# Patient Record
Sex: FEMALE | Race: ASIAN | URBAN - METROPOLITAN AREA
[De-identification: names, ages, dates, MRNs, and addresses within clinical notes are randomized per-mention and may not be internally consistent; named-entity substitution may affect disease eponyms.]

---

## 2023-11-08 ENCOUNTER — OFFICE VISIT (OUTPATIENT)
Dept: INTERNAL MEDICINE CLINIC | Facility: CLINIC | Age: 38
End: 2023-11-08
Payer: COMMERCIAL

## 2023-11-08 VITALS
SYSTOLIC BLOOD PRESSURE: 122 MMHG | HEIGHT: 63 IN | BODY MASS INDEX: 29.06 KG/M2 | WEIGHT: 164 LBS | TEMPERATURE: 98 F | DIASTOLIC BLOOD PRESSURE: 70 MMHG | RESPIRATION RATE: 16 BRPM | HEART RATE: 82 BPM | OXYGEN SATURATION: 97 %

## 2023-11-08 DIAGNOSIS — Z13.0 SCREENING FOR ENDOCRINE, METABOLIC AND IMMUNITY DISORDER: ICD-10-CM

## 2023-11-08 DIAGNOSIS — Z00.00 ENCOUNTER FOR ANNUAL PHYSICAL EXAM: ICD-10-CM

## 2023-11-08 DIAGNOSIS — Z13.228 SCREENING FOR ENDOCRINE, METABOLIC AND IMMUNITY DISORDER: ICD-10-CM

## 2023-11-08 DIAGNOSIS — N30.00 ACUTE CYSTITIS WITHOUT HEMATURIA: Primary | ICD-10-CM

## 2023-11-08 DIAGNOSIS — Z13.220 SCREENING FOR LIPID DISORDERS: ICD-10-CM

## 2023-11-08 DIAGNOSIS — E03.9 HYPOTHYROIDISM, UNSPECIFIED TYPE: ICD-10-CM

## 2023-11-08 DIAGNOSIS — Z13.0 SCREENING FOR DEFICIENCY ANEMIA: ICD-10-CM

## 2023-11-08 DIAGNOSIS — Z13.29 SCREENING FOR ENDOCRINE, METABOLIC AND IMMUNITY DISORDER: ICD-10-CM

## 2023-11-08 DIAGNOSIS — Z13.1 SCREENING FOR DIABETES MELLITUS: ICD-10-CM

## 2023-11-08 LAB
BILIRUBIN: NEGATIVE
GLUCOSE (URINE DIPSTICK): NEGATIVE MG/DL
KETONES (URINE DIPSTICK): NEGATIVE MG/DL
LEUKOCYTES: NEGATIVE
MULTISTIX EXPIRATION DATE: ABNORMAL DATE
MULTISTIX LOT#: ABNORMAL NUMERIC
NITRITE, URINE: NEGATIVE
PH, URINE: 6.5 (ref 4.5–8)
PROTEIN (URINE DIPSTICK): NEGATIVE MG/DL
SPECIFIC GRAVITY: 1.01 (ref 1–1.03)
UROBILINOGEN,SEMI-QN: 0.2 MG/DL (ref 0–1.9)

## 2023-11-08 PROCEDURE — 87086 URINE CULTURE/COLONY COUNT: CPT | Performed by: NURSE PRACTITIONER

## 2023-11-08 PROCEDURE — 3078F DIAST BP <80 MM HG: CPT | Performed by: NURSE PRACTITIONER

## 2023-11-08 PROCEDURE — 99204 OFFICE O/P NEW MOD 45 MIN: CPT | Performed by: NURSE PRACTITIONER

## 2023-11-08 PROCEDURE — 81003 URINALYSIS AUTO W/O SCOPE: CPT | Performed by: NURSE PRACTITIONER

## 2023-11-08 PROCEDURE — 3008F BODY MASS INDEX DOCD: CPT | Performed by: NURSE PRACTITIONER

## 2023-11-08 PROCEDURE — 3074F SYST BP LT 130 MM HG: CPT | Performed by: NURSE PRACTITIONER

## 2023-11-08 RX ORDER — NITROFURANTOIN 25; 75 MG/1; MG/1
100 CAPSULE ORAL 2 TIMES DAILY
Qty: 14 CAPSULE | Refills: 0 | Status: SHIPPED | OUTPATIENT
Start: 2023-11-08 | End: 2023-11-15

## 2023-11-08 NOTE — PATIENT INSTRUCTIONS
Start the antibiotic. Finish the full course even if you start feeling better. Take antibiotic completely as ordered. Take antibiotic with food. Eat yogurt twice a day while on antibiotic or take an oral probiotic. Monitor for diarrhea, side effects, allergy. Drink at least 64-80 oz of water. Always wipe from front to back. Avoid any sexual intercourse until after finishing antibiotics. Go to ER or call 911 if symptoms develop such as blood in the urine, persistent fever >103, severe abdominal pain. Get your labs done prior to annual physical. You should be fasting for at least 10 hours. If you take a multivitamin with Biotin or any biotin product it should be held for 3 days prior to getting your labs done.  If you use [facility] labs, you may schedule at [phone] or on-line at [facility].

## 2023-11-13 ENCOUNTER — OFFICE VISIT (OUTPATIENT)
Dept: INTERNAL MEDICINE CLINIC | Facility: CLINIC | Age: 38
End: 2023-11-13
Payer: COMMERCIAL

## 2023-11-13 VITALS
SYSTOLIC BLOOD PRESSURE: 108 MMHG | HEIGHT: 63 IN | WEIGHT: 164.63 LBS | RESPIRATION RATE: 12 BRPM | HEART RATE: 76 BPM | TEMPERATURE: 98 F | BODY MASS INDEX: 29.17 KG/M2 | DIASTOLIC BLOOD PRESSURE: 68 MMHG

## 2023-11-13 DIAGNOSIS — Z00.00 PHYSICAL EXAM, ANNUAL: ICD-10-CM

## 2023-11-13 DIAGNOSIS — Z23 NEED FOR VACCINATION: ICD-10-CM

## 2023-11-13 DIAGNOSIS — Z12.31 ENCOUNTER FOR SCREENING MAMMOGRAM FOR MALIGNANT NEOPLASM OF BREAST: ICD-10-CM

## 2023-11-13 DIAGNOSIS — E03.9 HYPOTHYROIDISM, UNSPECIFIED TYPE: ICD-10-CM

## 2023-11-13 DIAGNOSIS — Z01.419 VISIT FOR PELVIC EXAM: ICD-10-CM

## 2023-11-13 PROCEDURE — 87624 HPV HI-RISK TYP POOLED RSLT: CPT | Performed by: INTERNAL MEDICINE

## 2023-11-13 PROCEDURE — 88175 CYTOPATH C/V AUTO FLUID REDO: CPT | Performed by: INTERNAL MEDICINE

## 2023-11-13 RX ORDER — LEVOTHYROXINE SODIUM 88 UG/1
88 TABLET ORAL
COMMUNITY

## 2023-11-17 LAB
.: NORMAL
.: NORMAL

## 2023-11-20 LAB — HPV I/H RISK 1 DNA SPEC QL NAA+PROBE: NEGATIVE

## 2023-11-22 ENCOUNTER — PATIENT MESSAGE (OUTPATIENT)
Dept: INTERNAL MEDICINE CLINIC | Facility: CLINIC | Age: 38
End: 2023-11-22

## 2023-11-22 NOTE — TELEPHONE ENCOUNTER
From: Irma Cr  To: Lisandra Keen  Sent: 11/22/2023 9:49 AM CST  Subject: Urine infection     Buzz Najera,    I completed antibiotics dose, buy still not improvement, post urine burning. Now I observing white discharge, abdominal and back pain. What's issue ? Can you recommend any medicine?

## 2023-11-22 NOTE — TELEPHONE ENCOUNTER
Pt still having symptoms despite abx, now endorsing discharge and abd/back pain. Should pt go to [facility] for eval? Any other recommendation? Thank you!

## 2023-11-30 ENCOUNTER — LAB ENCOUNTER (OUTPATIENT)
Dept: LAB | Age: 38
End: 2023-11-30
Attending: NURSE PRACTITIONER
Payer: COMMERCIAL

## 2023-11-30 DIAGNOSIS — R71.8 LOW MEAN CORPUSCULAR VOLUME (MCV): ICD-10-CM

## 2023-11-30 DIAGNOSIS — Z13.1 SCREENING FOR DIABETES MELLITUS: ICD-10-CM

## 2023-11-30 DIAGNOSIS — Z13.0 SCREENING FOR ENDOCRINE, METABOLIC AND IMMUNITY DISORDER: ICD-10-CM

## 2023-11-30 DIAGNOSIS — Z13.228 SCREENING FOR ENDOCRINE, METABOLIC AND IMMUNITY DISORDER: ICD-10-CM

## 2023-11-30 DIAGNOSIS — E03.9 HYPOTHYROIDISM, UNSPECIFIED TYPE: ICD-10-CM

## 2023-11-30 DIAGNOSIS — Z13.29 SCREENING FOR ENDOCRINE, METABOLIC AND IMMUNITY DISORDER: ICD-10-CM

## 2023-11-30 DIAGNOSIS — Z13.220 SCREENING FOR LIPID DISORDERS: ICD-10-CM

## 2023-11-30 DIAGNOSIS — Z00.00 ENCOUNTER FOR ANNUAL PHYSICAL EXAM: ICD-10-CM

## 2023-11-30 DIAGNOSIS — Z13.0 SCREENING FOR DEFICIENCY ANEMIA: ICD-10-CM

## 2023-11-30 PROBLEM — R73.03 PRE-DIABETES: Status: ACTIVE | Noted: 2023-11-30

## 2023-11-30 PROBLEM — E61.1 IRON DEFICIENCY: Status: ACTIVE | Noted: 2023-11-30

## 2023-11-30 LAB
ALBUMIN SERPL-MCNC: 3.5 G/DL (ref 3.4–5)
ALBUMIN/GLOB SERPL: 1 {RATIO} (ref 1–2)
ALP LIVER SERPL-CCNC: 48 U/L
ALT SERPL-CCNC: 16 U/L
ANION GAP SERPL CALC-SCNC: 4 MMOL/L (ref 0–18)
AST SERPL-CCNC: 10 U/L (ref 15–37)
BASOPHILS # BLD AUTO: 0.08 X10(3) UL (ref 0–0.2)
BASOPHILS NFR BLD AUTO: 1.2 %
BILIRUB SERPL-MCNC: 0.4 MG/DL (ref 0.1–2)
BUN BLD-MCNC: 6 MG/DL (ref 9–23)
CALCIUM BLD-MCNC: 7.9 MG/DL (ref 8.5–10.1)
CHLORIDE SERPL-SCNC: 107 MMOL/L (ref 98–112)
CHOLEST SERPL-MCNC: 158 MG/DL (ref ?–200)
CO2 SERPL-SCNC: 24 MMOL/L (ref 21–32)
CREAT BLD-MCNC: 0.71 MG/DL
DEPRECATED HBV CORE AB SER IA-ACNC: 11.8 NG/ML
EGFRCR SERPLBLD CKD-EPI 2021: 110 ML/MIN/1.73M2 (ref 60–?)
EOSINOPHIL # BLD AUTO: 0.14 X10(3) UL (ref 0–0.7)
EOSINOPHIL NFR BLD AUTO: 2.1 %
ERYTHROCYTE [DISTWIDTH] IN BLOOD BY AUTOMATED COUNT: 14.3 %
EST. AVERAGE GLUCOSE BLD GHB EST-MCNC: 126 MG/DL (ref 68–126)
FASTING PATIENT LIPID ANSWER: YES
FASTING STATUS PATIENT QL REPORTED: YES
GLOBULIN PLAS-MCNC: 3.6 G/DL (ref 2.8–4.4)
GLUCOSE BLD-MCNC: 98 MG/DL (ref 70–99)
HBA1C MFR BLD: 6 % (ref ?–5.7)
HCT VFR BLD AUTO: 36.4 %
HDLC SERPL-MCNC: 38 MG/DL (ref 40–59)
HGB BLD-MCNC: 12 G/DL
IMM GRANULOCYTES # BLD AUTO: 0.03 X10(3) UL (ref 0–1)
IMM GRANULOCYTES NFR BLD: 0.4 %
IRON SATN MFR SERPL: 15 %
IRON SERPL-MCNC: 73 UG/DL
LDLC SERPL CALC-MCNC: 103 MG/DL (ref ?–100)
LYMPHOCYTES # BLD AUTO: 1.55 X10(3) UL (ref 1–4)
LYMPHOCYTES NFR BLD AUTO: 23.1 %
MCH RBC QN AUTO: 26.2 PG (ref 26–34)
MCHC RBC AUTO-ENTMCNC: 33 G/DL (ref 31–37)
MCV RBC AUTO: 79.5 FL
MONOCYTES # BLD AUTO: 0.45 X10(3) UL (ref 0.1–1)
MONOCYTES NFR BLD AUTO: 6.7 %
NEUTROPHILS # BLD AUTO: 4.47 X10 (3) UL (ref 1.5–7.7)
NEUTROPHILS # BLD AUTO: 4.47 X10(3) UL (ref 1.5–7.7)
NEUTROPHILS NFR BLD AUTO: 66.5 %
NONHDLC SERPL-MCNC: 120 MG/DL (ref ?–130)
OSMOLALITY SERPL CALC.SUM OF ELEC: 278 MOSM/KG (ref 275–295)
PLATELET # BLD AUTO: 366 10(3)UL (ref 150–450)
POTASSIUM SERPL-SCNC: 4 MMOL/L (ref 3.5–5.1)
PROT SERPL-MCNC: 7.1 G/DL (ref 6.4–8.2)
RBC # BLD AUTO: 4.58 X10(6)UL
SODIUM SERPL-SCNC: 135 MMOL/L (ref 136–145)
TIBC SERPL-MCNC: 502 UG/DL (ref 240–450)
TRANSFERRIN SERPL-MCNC: 337 MG/DL (ref 200–360)
TRIGL SERPL-MCNC: 92 MG/DL (ref 30–149)
TSI SER-ACNC: 2.42 MIU/ML (ref 0.36–3.74)
VLDLC SERPL CALC-MCNC: 15 MG/DL (ref 0–30)
WBC # BLD AUTO: 6.7 X10(3) UL (ref 4–11)

## 2023-11-30 PROCEDURE — 83540 ASSAY OF IRON: CPT

## 2023-11-30 PROCEDURE — 36415 COLL VENOUS BLD VENIPUNCTURE: CPT

## 2023-11-30 PROCEDURE — 82728 ASSAY OF FERRITIN: CPT

## 2023-11-30 PROCEDURE — 83550 IRON BINDING TEST: CPT

## 2023-11-30 PROCEDURE — 80053 COMPREHEN METABOLIC PANEL: CPT

## 2023-11-30 PROCEDURE — 84443 ASSAY THYROID STIM HORMONE: CPT

## 2023-11-30 PROCEDURE — 83036 HEMOGLOBIN GLYCOSYLATED A1C: CPT

## 2023-11-30 PROCEDURE — 85025 COMPLETE CBC W/AUTO DIFF WBC: CPT

## 2023-11-30 PROCEDURE — 80061 LIPID PANEL: CPT

## 2023-12-01 ENCOUNTER — HOSPITAL ENCOUNTER (OUTPATIENT)
Dept: MAMMOGRAPHY | Facility: HOSPITAL | Age: 38
Discharge: HOME | End: 2023-12-01
Attending: INTERNAL MEDICINE
Payer: COMMERCIAL

## 2023-12-01 DIAGNOSIS — Z12.31 ENCOUNTER FOR SCREENING MAMMOGRAM FOR MALIGNANT NEOPLASM OF BREAST: ICD-10-CM

## 2023-12-01 PROCEDURE — 77063 BREAST TOMOSYNTHESIS BI: CPT | Performed by: INTERNAL MEDICINE

## 2023-12-01 PROCEDURE — 77067 SCR MAMMO BI INCL CAD: CPT | Performed by: INTERNAL MEDICINE

## 2024-04-24 ENCOUNTER — TELEPHONE (OUTPATIENT)
Dept: INTERNAL MEDICINE CLINIC | Facility: CLINIC | Age: 39
End: 2024-04-24

## 2024-04-24 RX ORDER — LEVOTHYROXINE SODIUM 88 UG/1
88 TABLET ORAL
Qty: 90 TABLET | Refills: 2 | Status: SHIPPED | OUTPATIENT
Start: 2024-04-24

## 2024-04-24 NOTE — TELEPHONE ENCOUNTER
Last OV relevant to medication: 11/13/23  Last refill date: historical  When pt was asked to return for OV: 11/13/24  Upcoming appt/reason: No future appointments.  Was pt informed of any over due labs: message sent  TSH (mIU/mL)   Date Value   11/30/2023 2.420

## 2024-04-24 NOTE — TELEPHONE ENCOUNTER
Is this medication prescribed by the [facility] Providers? No    Did the patient contact the pharmacy directly?:  Yes    Is patient out of meds or supply very low?:  Yes, 0    Medication Requested:  levothyroxine     Dose:  88 MCG Oral Tab     Is patient requesting a 30 or 90 day supply?:  90    Pharmacy name and phone # or location:  [facility]/pharmacy #[id number] [address] [address] [phone], [phone]     Is the patient due for an appointment?: Yes, unable to schedule at this time  (if so, please schedule appt)    Additional Notes:      Please advise the patient refills take up to 72 business hours.

## 2024-04-24 NOTE — TELEPHONE ENCOUNTER
Patient's  followed up on this request.  Patient is out of medication and is aware she needs to complete the blood tests.   Susana,  Your symptoms require an office visit for proper diagnosis and treatment. I would recommend being seen sooner than later. If this is potentially influenza, you only have 72 hours from the start of the fever to start treatment. Please call the clinic if you have any questions. Thank you.    Adrianne Felix PA-C

## 2024-05-31 ENCOUNTER — TELEPHONE (OUTPATIENT)
Dept: INTERNAL MEDICINE CLINIC | Facility: CLINIC | Age: 39
End: 2024-05-31

## 2024-05-31 RX ORDER — LEVOTHYROXINE SODIUM 88 UG/1
88 TABLET ORAL
Qty: 90 TABLET | Refills: 2 | Status: CANCELLED | OUTPATIENT
Start: 2024-05-31

## 2024-05-31 NOTE — TELEPHONE ENCOUNTER
Is this medication prescribed by the [facility] Providers? Yes    Did the patient contact the pharmacy directly?:  No    Is patient out of meds or supply very low?:  Yes, 0    Medication Requested:  levothyroxine     Dose:  88 MCG Oral Tab     Is patient requesting a 30 or 90 day supply?:  90    Pharmacy name and phone # or location:    [facility]/pharmacy #[id number] [address] [address] [phone], [phone]       Is the patient due for an appointment?: No  (if so, please schedule appt)    Additional Notes:  Patient lost all of medication and needs a refill as soon as possible.     Please advise the patient refills take up to 72 business hours.

## 2024-05-31 NOTE — TELEPHONE ENCOUNTER
Pt lost rx on vacation, refills on file with [facility]. Can't send more refills. Advised to contact insurance for vacation override to allow pharmacy to dispense early.

## 2024-05-31 NOTE — TELEPHONE ENCOUNTER
Please see other refill request from today, since there are refills on file and last was dispensed in April we can't refill now, is an insurance issue. Advised pt to contact insurance to allow for early dispense as he mis located meds on vacation, they can allow for insurance override for pharmacy to dispense earlier. Pt informed and verbalized understanding.

## 2024-07-22 RX ORDER — LEVOTHYROXINE SODIUM 88 UG/1
88 TABLET ORAL
Qty: 90 TABLET | Refills: 2 | OUTPATIENT
Start: 2024-07-22

## 2024-07-26 ENCOUNTER — OFFICE VISIT (OUTPATIENT)
Dept: INTERNAL MEDICINE CLINIC | Facility: CLINIC | Age: 39
End: 2024-07-26
Payer: COMMERCIAL

## 2024-07-26 VITALS
BODY MASS INDEX: 30.57 KG/M2 | WEIGHT: 174.69 LBS | TEMPERATURE: 98 F | RESPIRATION RATE: 12 BRPM | HEIGHT: 63.25 IN | HEART RATE: 76 BPM | SYSTOLIC BLOOD PRESSURE: 118 MMHG | DIASTOLIC BLOOD PRESSURE: 70 MMHG

## 2024-07-26 DIAGNOSIS — E61.1 IRON DEFICIENCY: ICD-10-CM

## 2024-07-26 DIAGNOSIS — K64.9 HEMORRHOIDS, UNSPECIFIED HEMORRHOID TYPE: Primary | ICD-10-CM

## 2024-07-26 DIAGNOSIS — R92.30 DENSE BREASTS: ICD-10-CM

## 2024-07-26 DIAGNOSIS — E03.9 HYPOTHYROIDISM, UNSPECIFIED TYPE: ICD-10-CM

## 2024-07-26 DIAGNOSIS — Z00.00 PHYSICAL EXAM, ANNUAL: ICD-10-CM

## 2024-07-26 PROCEDURE — 99214 OFFICE O/P EST MOD 30 MIN: CPT | Performed by: INTERNAL MEDICINE

## 2024-07-26 RX ORDER — HYDROCORTISONE 25 MG/G
1 CREAM TOPICAL 2 TIMES DAILY
COMMUNITY
Start: 2024-07-18

## 2024-07-26 NOTE — PROGRESS NOTES
[facility]    CHIEF COMPLAINT:    Chief Complaint   Patient presents with    Hemorrhoids     11/13/23-pap. 12/1/23-mammo.          HISTORY OF PRESENT ILLNESS:  Complains of hemorrhoids and anal fissure.   Has had symptoms for 5-6 years but has occasional flares.   Has had this flare for about 3 weeks.   Has pain when she has a bm. Has some blood when she wipes. No blood in the toilet bowl or on the stool.   Usually flares around her menses.   She has been using an herbal cream from [employer] but hasn't resolved.     She had a visit with an outside physician last week and they gave her anusol hc cream. She only started using this 2 days ago.     She wants to go over mammo results from 12/2023. She had a dense breast advisory.     Wants to know if she needs to recheck her tsh. She is on levothyroxine currently.     She had iron deficiency on her last labs. She was supposed to take iron supplements and recheck in 3 months but has not done so.     REVIEW OF SYSTEMS:  See HPI    Current Medications:    Current Outpatient Medications   Medication Sig Dispense Refill    hydrocortisone 2.5 % External Cream 1 Application 2 (two) times daily. APPLY TO AFFECTED AREA      levothyroxine 88 MCG Oral Tab Take 1 tablet (88 mcg total) by mouth before breakfast. 90 tablet 2       PAST MEDICAL, SOCIAL, AND FAMILY HISTORY:  Tobacco:    History   Smoking Status    Never   Smokeless Tobacco    Never       PHYSICAL EXAM:  /70 (BP Location: Right arm, Patient Position: Sitting, Cuff Size: adult)   Pulse 76   Temp 97.8 °F (36.6 °C) (Temporal)   Resp 12   Ht 5' 3.25\" (1.607 m)   Wt 174 lb 11.2 oz (79.2 kg)   LMP 07/20/2024 (Exact Date)   Breastfeeding No   BMI 30.70 kg/m²    GENERAL: well developed, well nourished,in no apparent distress  RECTAL: no external hemorrhoid. Has tenderness upon palpation of anus and the anal verge. No hemorrhoids palpated.     DATA:  Results for orders placed or performed in visit on 11/30/23    TSH W Reflex To Free T4   Result Value Ref Range    TSH 2.420 0.358 - 3.740 mIU/mL   Comp Metabolic Panel   Result Value Ref Range    Glucose 98 70 - 99 mg/dL    Sodium 135 (L) 136 - 145 mmol/L    Potassium 4.0 3.5 - 5.1 mmol/L    Chloride 107 98 - 112 mmol/L    CO2 24.0 21.0 - 32.0 mmol/L    Anion Gap 4 0 - 18 mmol/L    BUN 6 (L) 9 - 23 mg/dL    Creatinine 0.71 0.55 - 1.02 mg/dL    Calcium, Total 7.9 (L) 8.5 - 10.1 mg/dL    Calculated Osmolality 278 275 - 295 mOsm/kg    eGFR-Cr 110 >=60 mL/min/1.73m2    AST 10 (L) 15 - 37 U/L    ALT 16 13 - 56 U/L    Alkaline Phosphatase 48 37 - 98 U/L    Bilirubin, Total 0.4 0.1 - 2.0 mg/dL    Total Protein 7.1 6.4 - 8.2 g/dL    Albumin 3.5 3.4 - 5.0 g/dL    Globulin  3.6 2.8 - 4.4 g/dL    A/G Ratio 1.0 1.0 - 2.0    Patient Fasting for CMP? Yes    Lipid Panel   Result Value Ref Range    Cholesterol, Total 158 <200 mg/dL    HDL Cholesterol 38 (L) 40 - 59 mg/dL    Triglycerides 92 30 - 149 mg/dL    LDL Cholesterol 103 (H) <100 mg/dL    VLDL 15 0 - 30 mg/dL    Non HDL Chol 120 <130 mg/dL    Patient Fasting for Lipid? Yes    Hemoglobin A1C   Result Value Ref Range    HgbA1C 6.0 (H) <5.7 %    Estimated Average Glucose 126 68 - 126 mg/dL   Ferritin [E]   Result Value Ref Range    Ferritin 11.8 (L) 12.0 - 240.0 ng/mL   Iron And Tibc [E]   Result Value Ref Range    Iron 73 50 - 170 ug/dL    Transferrin 337 200 - 360 mg/dL    Total Iron Binding Capacity 502 (H) 240 - 450 ug/dL    % Saturation 15 15 - 50 %   CBC W/ DIFFERENTIAL   Result Value Ref Range    WBC 6.7 4.0 - 11.0 x10(3) uL    RBC 4.58 3.80 - 5.30 x10(6)uL    HGB 12.0 12.0 - 16.0 g/dL    HCT 36.4 35.0 - 48.0 %    .0 150.0 - 450.0 10(3)uL    MCV 79.5 (L) 80.0 - 100.0 fL    MCH 26.2 26.0 - 34.0 pg    MCHC 33.0 31.0 - 37.0 g/dL    RDW 14.3 %    Neutrophil Absolute Prelim 4.47 1.50 - 7.70 x10 (3) uL    Neutrophil Absolute 4.47 1.50 - 7.70 x10(3) uL    Lymphocyte Absolute 1.55 1.00 - 4.00 x10(3) uL    Monocyte Absolute 0.45  0.10 - 1.00 x10(3) uL    Eosinophil Absolute 0.14 0.00 - 0.70 x10(3) uL    Basophil Absolute 0.08 0.00 - 0.20 x10(3) uL    Immature Granulocyte Absolute 0.03 0.00 - 1.00 x10(3) uL    Neutrophil % 66.5 %    Lymphocyte % 23.1 %    Monocyte % 6.7 %    Eosinophil % 2.1 %    Basophil % 1.2 %    Immature Granulocyte % 0.4 %            ASSESSMENT AND PLAN:  1. Hemorrhoids, unspecified hemorrhoid type  Use the anusol hc that was prescribed for her.   Use preparation H.   Try sitz baths.   If no relief in 3-4 weeks she should see gi.   - EVALUATE & TREAT, GASTRO (INTERNAL)    2. Physical exam, annual  Do labs prior to cpx in 11/2024.   - Comp Metabolic Panel; Future  - Lipid Panel; Future  - Hemoglobin A1C; Future  - TSH W Reflex To Free T4; Future    3. Dense breasts  Discussed mammo and recommendations for dense breasts.     4. Hypothyroidism, unspecified type  Continue levothyroxine. Do labs in November as above.     5. Iron deficiency  Will need labs.         Return if symptoms worsen or fail to improve.      Dania Harris MD

## 2024-09-24 RX ORDER — LEVOTHYROXINE SODIUM 88 UG/1
88 TABLET ORAL
Qty: 90 TABLET | Refills: 0 | Status: SHIPPED | OUTPATIENT
Start: 2024-09-24

## 2024-09-24 NOTE — TELEPHONE ENCOUNTER
Thyroid Medication Protocol [id number]/21/2024 09:58 AM   Protocol Details TSH in past 12 months    Last TSH value is normal    In person appointment or virtual visit in the past 12 mos or appointment in next 3 mos   4. Hypothyroidism, unspecified type  Continue levothyroxine. Do labs in November as above.   Future Appointments   Date Time Provider Department Center   11/6/2024 12:30 PM Zen Fernandes MD [facility] SUB GI   11/22/2024  7:40 AM Dania Harris MD [facility] [place]

## 2024-11-21 NOTE — PROGRESS NOTES
[facility]    CHIEF COMPLAINT:   Chief Complaint   Patient presents with    Routine Physical     11/13/23-normal pap, neg HPV. 12/1/23-mammo.          HPI:   Laura Barroso is a 39 year old female who presents for a complete physical exam. Symptoms: denies discharge, itching, burning or dysuria.     Pap done 11/2023 negative with negative hpv.   Mammo done 12/2023. Ordered.     She does not remember date of last tetanus vaccine, she gave us a date last year but it wasn't accurate. She would just like to get a tdap today.   Flu shot done today.   Due covid booster. Get at her local pharmacy.     Exercise: 30 minute dance daily.     Derm: no concerns. Has some skin tags on neck.     Also yearly med check.   Hypothyroid: on levothyroxine. No fatigue or palpitations.     Iron deficiency: not taking iron supplements. She cannot tolerate them. Causes constipation and she has a problem with hemorrhoids.     Saw gi for hemorrhoids. Colonoscopy was recommended. She plans to do this in [facility].       Wt Readings from Last 6 Encounters:   11/22/24 171 lb 1.6 oz (77.6 kg)   09/06/24 174 lb 6.4 oz (79.1 kg)   07/26/24 174 lb 11.2 oz (79.2 kg)   11/13/23 164 lb 9.6 oz (74.7 kg)   11/08/23 164 lb (74.4 kg)     Body mass index is 30.31 kg/m².       Current Outpatient Medications   Medication Sig Dispense Refill    levothyroxine 88 MCG Oral Tab Take 1 tablet (88 mcg total) by mouth before breakfast. 90 tablet 0    Emulsifying Ointment Does not apply Ointment Nitroglycerin ointment 0.125%, apply rectally TID for 6 weeks as needed, #30g- 1 refill. 30 g 1    docusate sodium 100 MG Oral Cap Take 1 capsule (100 mg total) by mouth 2 (two) times daily. 180 capsule 0      Past Medical History:    Abdominal pain    Occasionally    Bloating    Occasionally    Calculus of kidney    Chest pain    Constipation    Before or after every menstrual cycle    Flatulence/gas pain/belching    Occasionally    Frequent use of laxatives     Whenever feels like constipated    Frequent UTI    Burning    Heartburn    Long time    Hypothyroidism    Pain with bowel movements    Shortness of breath    Last 6 /7 months    Sleep disturbance      Past Surgical History:   Procedure Laterality Date            Family History   Problem Relation Age of Onset    Diabetes Mother     Other (parkinsons) Mother     Hypertension Father     No Known Problems Brother     Thyroid disease Brother     No Known Problems Daughter       Social History:   Social History     Socioeconomic History    Marital status:     Number of children: 1   Tobacco Use    Smoking status: Never    Smokeless tobacco: Never   Vaping Use    Vaping status: Never Used   Substance and Sexual Activity    Alcohol use: Never    Drug use: Never    Sexual activity: Yes     Partners: Male   Other Topics Concern    Exercise Yes     Comment: walk, treadmill     : yes.   Exercise:  see hpi .  Diet: watches calories closely     REVIEW OF SYSTEMS:   GENERAL: feels well otherwise  SKIN: denies any unusual skin lesions  EYES:denies blurred vision or double vision  HEENT: denies nasal congestion, sinus pain or ST  LUNGS: denies shortness of breath with exertion  CARDIOVASCULAR: denies chest pain on exertion  GI: denies abdominal pain,denies heartburn  : denies dysuria, vaginal discharge or itching,periods regular   MUSCULOSKELETAL: denies back pain  NEURO: denies headaches  PSYCHE: denies depression or anxiety  HEMATOLOGIC: denies hx of anemia  ENDOCRINE: hypothyroid  ALL/ASTHMA: denies hx of allergy or asthma    EXAM:   /68 (BP Location: Right arm, Patient Position: Sitting, Cuff Size: adult)   Pulse 76   Temp 97.6 °F (36.4 °C) (Temporal)   Resp 12   Ht 5' 3\" (1.6 m)   Wt 171 lb 1.6 oz (77.6 kg)   LMP 2024 (Exact Date)   Breastfeeding No   BMI 30.31 kg/m²   Body mass index is 30.31 kg/m².   GENERAL: well developed, well nourished,in no apparent distress  SKIN: no  rashes,no suspicious lesions  HEENT: atraumatic, normocephalic,ears and throat are clear  EYES:PERRLA, conjunctiva are clear  NECK: supple,no adenopathy,no bruits  CHEST: no chest tenderness  LUNGS: clear to auscultation  CARDIO: nl s1 and s2, RRR without murmur  GI: good BS's,no masses, HSM or tenderness  BREAST: no dominant or suspicious mass, no nipple discharge  GENITAL/URINARY:  External Genitalia:  General appearance; normal, Hair distribution; normal, Lesions absent, Urethral Meatus:  Size normal, Location normal, Lesions absent, Prolapse absent, Vagina:  General appearance normal, Lesions absent, Pelvic support normal, Cervix:  General appearance normal, Discharge absent, Tenderness absent, Enlargement absent, Uterus:  Masses absent, Adnexa:  Masses absent, Tenderness absent, Anus/Perineum:  Lesions absent and Masses absent  No pap today.   MUSCULOSKELETAL: back is not tender,FROM of the back  EXTREMITIES: no cyanosis, clubbing or edema  NEURO: Oriented times three,cranial nerves are intact,motor and sensory are grossly intact    Labs:   Lab Results   Component Value Date/Time    WBC 6.7 11/30/2023 08:24 AM    HGB 12.0 11/30/2023 08:24 AM    .0 11/30/2023 08:24 AM      Lab Results   Component Value Date/Time    GLU 98 11/30/2023 08:24 AM     (L) 11/30/2023 08:24 AM    K 4.0 11/30/2023 08:24 AM     11/30/2023 08:24 AM    CO2 24.0 11/30/2023 08:24 AM    CREATSERUM 0.71 11/30/2023 08:24 AM    CA 7.9 (L) 11/30/2023 08:24 AM    ALB 3.5 11/30/2023 08:24 AM    TP 7.1 11/30/2023 08:24 AM    ALKPHO 48 11/30/2023 08:24 AM    AST 10 (L) 11/30/2023 08:24 AM    ALT 16 11/30/2023 08:24 AM    BILT 0.4 11/30/2023 08:24 AM    TSH 2.420 11/30/2023 08:24 AM        Lab Results   Component Value Date/Time    CHOLEST 158 11/30/2023 08:24 AM    HDL 38 (L) 11/30/2023 08:24 AM    TRIG 92 11/30/2023 08:24 AM     (H) 11/30/2023 08:24 AM    NONHDLC 120 11/30/2023 08:24 AM       Lab Results   Component Value  Date/Time    A1C 6.0 (H) 11/30/2023 08:24 AM      Vitamin D:    No results found for: \"VITD\"        ASSESSMENT AND PLAN:   Laura Barroso is a 39 year old female who presents for a complete physical exam.   1. Physical exam, annual  Do labs. Also has labs that were ordered in July, needs to do those as well.   Pelvic and breast exam done.   Mammo ordered.   Immunizations discussed. Tdap and flu shot today.   Continue regular exercise.   - CBC W Differential W Platelet [E]  - Iron And Tibc [E]  - Ferritin [E]    2. Need for vaccination  - INFLUENZA VACCINE, TRI, PRESERV FREE, 0.5 ML  - TdaP (Adacel, Boostrix) [25427]    3. Encounter for screening mammogram for malignant neoplasm of breast  - [facility] JOANNA 2D+3D SCREENING BILAT (CPT=77067/19267); Future    4. Iron deficiency  Not taking iron supplements.   Do labs. If still low will need iv iron.     5. Hypothyroid  Continue levothyroxine. Do labs.         Return in about 1 year (around 11/22/2025) for physical.      Dania Harris MD

## 2024-11-22 ENCOUNTER — OFFICE VISIT (OUTPATIENT)
Dept: INTERNAL MEDICINE CLINIC | Facility: CLINIC | Age: 39
End: 2024-11-22
Payer: COMMERCIAL

## 2024-11-22 VITALS
HEIGHT: 63 IN | RESPIRATION RATE: 12 BRPM | DIASTOLIC BLOOD PRESSURE: 68 MMHG | BODY MASS INDEX: 30.32 KG/M2 | WEIGHT: 171.13 LBS | TEMPERATURE: 98 F | HEART RATE: 76 BPM | SYSTOLIC BLOOD PRESSURE: 114 MMHG

## 2024-11-22 DIAGNOSIS — Z12.31 ENCOUNTER FOR SCREENING MAMMOGRAM FOR MALIGNANT NEOPLASM OF BREAST: ICD-10-CM

## 2024-11-22 DIAGNOSIS — Z23 NEED FOR VACCINATION: ICD-10-CM

## 2024-11-22 DIAGNOSIS — Z00.00 PHYSICAL EXAM, ANNUAL: ICD-10-CM

## 2024-11-22 DIAGNOSIS — E03.9 HYPOTHYROIDISM, UNSPECIFIED TYPE: ICD-10-CM

## 2024-11-22 DIAGNOSIS — E61.1 IRON DEFICIENCY: ICD-10-CM

## 2024-12-21 LAB
% SATURATION: 12 % (CALC) (ref 16–45)
ABSOLUTE BASOPHILS: 50 CELLS/UL (ref 0–200)
ABSOLUTE EOSINOPHILS: 108 CELLS/UL (ref 15–500)
ABSOLUTE LYMPHOCYTES: 1843 CELLS/UL (ref 850–3900)
ABSOLUTE MONOCYTES: 504 CELLS/UL (ref 200–950)
ABSOLUTE NEUTROPHILS: 4694 CELLS/UL (ref 1500–7800)
BASOPHILS: 0.7 %
EOSINOPHILS: 1.5 %
FERRITIN: 8 NG/ML (ref 16–232)
HEMATOCRIT: 41.1 % (ref 35–45)
HEMOGLOBIN: 12.7 G/DL (ref 11.7–15.5)
IRON BINDING CAPACITY: 470 MCG/DL (CALC) (ref 250–450)
IRON, TOTAL: 55 MCG/DL (ref 40–190)
LYMPHOCYTES: 25.6 %
MCH: 24.7 PG (ref 27–33)
MCHC: 30.9 G/DL (ref 32–36)
MCV: 79.8 FL (ref 80–100)
MONOCYTES: 7 %
MPV: 10.5 FL (ref 7.5–12.5)
NEUTROPHILS: 65.2 %
PLATELET COUNT: 389 THOUSAND/UL (ref 140–400)
RDW: 13.7 % (ref 11–15)
RED BLOOD CELL COUNT: 5.15 MILLION/UL (ref 3.8–5.1)
WHITE BLOOD CELL COUNT: 7.2 THOUSAND/UL (ref 3.8–10.8)

## 2024-12-26 RX ORDER — LEVOTHYROXINE SODIUM 88 UG/1
88 TABLET ORAL
Qty: 90 TABLET | Refills: 1 | Status: SHIPPED | OUTPATIENT
Start: 2024-12-26

## 2025-01-07 ENCOUNTER — HOSPITAL ENCOUNTER (OUTPATIENT)
Dept: MAMMOGRAPHY | Age: 40
Discharge: HOME | End: 2025-01-07
Attending: INTERNAL MEDICINE
Payer: COMMERCIAL

## 2025-01-07 DIAGNOSIS — Z12.31 ENCOUNTER FOR SCREENING MAMMOGRAM FOR MALIGNANT NEOPLASM OF BREAST: ICD-10-CM

## 2025-01-07 PROCEDURE — 77063 BREAST TOMOSYNTHESIS BI: CPT | Performed by: INTERNAL MEDICINE

## 2025-01-07 PROCEDURE — 77067 SCR MAMMO BI INCL CAD: CPT | Performed by: INTERNAL MEDICINE

## 2025-01-10 ENCOUNTER — TELEPHONE (OUTPATIENT)
Dept: INTERNAL MEDICINE CLINIC | Facility: CLINIC | Age: 40
End: 2025-01-10

## 2025-01-10 NOTE — TELEPHONE ENCOUNTER
Patient would like to speak to Dr Harris regarding her MAMMOGRAPY results.    Please contact patient by cell #[phone].

## 2025-01-13 NOTE — TELEPHONE ENCOUNTER
Talked to pt and informed on the supplement imaging for the dense breast . Patient notified. Patient verbalized understanding

## 2025-06-20 RX ORDER — LEVOTHYROXINE SODIUM 88 UG/1
88 TABLET ORAL
Qty: 90 TABLET | Refills: 1 | Status: SHIPPED | OUTPATIENT
Start: 2025-06-20

## 2025-06-20 NOTE — TELEPHONE ENCOUNTER
Thyroid Medication Protocol [id number]/20/2025 12:14 AM   Protocol Details TSH in past 12 months    Last TSH value is normal    In person appointment or virtual visit in the past 12 mos or appointment in next 3 mos    Medication is active on med list      Hypothyroid: on levothyroxine. No fatigue or palpitations.   No future appointments.

## 2025-07-14 RX ORDER — LEVOTHYROXINE SODIUM 88 UG/1
88 TABLET ORAL
Qty: 90 TABLET | Refills: 1 | OUTPATIENT
Start: 2025-07-14

## 2025-07-14 NOTE — TELEPHONE ENCOUNTER
Disp Refills Start End    LEVOTHYROXINE 88 MCG Oral Tab 90 tablet 1 6/20/2025 --    Sig - Route: TAKE 1 TABLET BY MOUTH BEFORE BREAKFAST. - Oral    Sent to pharmacy as: Levothyroxine Sodium 88 MCG Oral Tablet (Synthroid)    E-Prescribing Status: Receipt confirmed by pharmacy (6/20/2025  3:15 PM CDT)      Pharmacy    [facility]/PHARMACY #[id number] - [facility] [address] [facility] [address] [phone], [phone]